# Patient Record
Sex: MALE | Race: BLACK OR AFRICAN AMERICAN | NOT HISPANIC OR LATINO | URBAN - NONMETROPOLITAN AREA
[De-identification: names, ages, dates, MRNs, and addresses within clinical notes are randomized per-mention and may not be internally consistent; named-entity substitution may affect disease eponyms.]

---

## 2019-04-12 ENCOUNTER — IMPORTED ENCOUNTER (OUTPATIENT)
Dept: URBAN - NONMETROPOLITAN AREA CLINIC 1 | Facility: CLINIC | Age: 69
End: 2019-04-12

## 2019-04-12 PROBLEM — H35.373: Noted: 2019-04-12

## 2019-04-12 PROBLEM — H40.1131: Noted: 2019-04-12

## 2019-04-12 PROBLEM — H43.813: Noted: 2019-04-12

## 2019-04-12 PROBLEM — E11.3593: Noted: 2019-04-12

## 2019-04-12 PROBLEM — Z96.1: Noted: 2019-04-12

## 2019-04-12 PROCEDURE — 99214 OFFICE O/P EST MOD 30 MIN: CPT

## 2019-04-12 PROCEDURE — 92083 EXTENDED VISUAL FIELD XM: CPT

## 2019-04-12 NOTE — PATIENT DISCUSSION
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 IOP at 12 OUC/D ratio is 0.4 OD and  0.7 OSContinue Timolol QAM OU and Latanoprost QHS OU Rx sent to [facility] in 3 months for follow up with Hx PDR s/p PRP OUDiscussed diagnosis with patientDiscussed the risk of diabetic damage of the retina with potential vision loss and the importance of routine follow-up. Emphasized strict blood sugar control. Continue to monitorPVD OUDiscussed findings of exam in detail with the patient. The risk of retinal detachment in patients with PVDs was discussed with the patient and the warning signs of retinal detachment were carefully reviewed with the patient. The patient was warned to return to the office or contact the ophthalmologist on call immediately if they experience signs of retinal detachment. Continue to monitorERM OUDiscussed diagnosis with patientContinue to monitorPseudophakia OUDiscussed diagnosis in detail with patientBoth intraocular lenses in place today and stableContinue to monitor; 's Notes: MR 6/5/15DFE 4/12/19VF 4/12/19OCT 11/10/17Gonio 3/9/18Optos 5/26/17

## 2020-09-18 ENCOUNTER — IMPORTED ENCOUNTER (OUTPATIENT)
Dept: URBAN - NONMETROPOLITAN AREA CLINIC 1 | Facility: CLINIC | Age: 70
End: 2020-09-18

## 2020-09-18 PROCEDURE — 99214 OFFICE O/P EST MOD 30 MIN: CPT

## 2020-09-18 PROCEDURE — 92133 CPTRZD OPH DX IMG PST SGM ON: CPT

## 2020-09-18 NOTE — PATIENT DISCUSSION
POAG OUDiscussed diagnosis with patientNo family history of diseaseDiscussed the chronic progressive nature of this disease and various treatment optionsImportance of good compliance with medications was emphasizedOCT done today; stable from previous with superior/inferior/temp NFL thinning OU. IOP at 12 OU. C/D ratio is 0.4 OD and  0.7 OSContinue Timolol QAM OU and Latanoprost QHS OU Rx sent to [facility] in 6 months for follow up with Optos and Gonio Hx PDR s/p PRP OUDiscussed diagnosis with patientDiscussed the risk of diabetic damage of the retina with potential vision loss and the importance of routine follow-up. Emphasized strict blood sugar control. Continue to monitorPVD OUDiscussed findings of exam in detail with the patient. The risk of retinal detachment in patients with PVDs was discussed with the patient and the warning signs of retinal detachment were carefully reviewed with the patient. The patient was warned to return to the office or contact the ophthalmologist on call immediately if they experience signs of retinal detachment. Continue to monitorERM OUDiscussed diagnosis with patientContinue to monitorPseudophakia OUDiscussed diagnosis in detail with patientBoth intraocular lenses in place today and stableContinue to monitor; 's Notes: MR 6/5/15DFE 9/18/20VF 4/12/19OCT 9/18/2Gonio 3/9/18Optos 5/26/17

## 2021-04-16 ENCOUNTER — IMPORTED ENCOUNTER (OUTPATIENT)
Dept: URBAN - NONMETROPOLITAN AREA CLINIC 1 | Facility: CLINIC | Age: 71
End: 2021-04-16

## 2021-04-16 PROCEDURE — 92020 GONIOSCOPY: CPT

## 2021-04-16 PROCEDURE — 99214 OFFICE O/P EST MOD 30 MIN: CPT

## 2021-04-16 NOTE — PATIENT DISCUSSION
POAG OUDiscussed diagnosis with patientNo family history of diseaseDiscussed the chronic progressive nature of this disease and various treatment optionsImportance of good compliance with medications was emphasizedGonio done today:  Grade IV with light pigment OUOCT done previously:  Stable from previous with superior/inferior/temp NFL thinning OU. IOP at 12 OD and 13 OSC/D ratio is 0.4 OD and  0.7 OSContinue Timolol QAM OU and Latanoprost QHS OU Rx sent to [facility] in 6 months for recheckHx PDR s/p PRP OUDiscussed diagnosis with patientDiscussed the risk of diabetic damage of the retina with potential vision loss and the importance of routine follow-up. Emphasized strict blood sugar control. Continue to monitorPVD OUDiscussed findings of exam in detail with the patient. The risk of retinal detachment in patients with PVDs was discussed with the patient and the warning signs of retinal detachment were carefully reviewed with the patient. The patient was warned to return to the office or contact the ophthalmologist on call immediately if they experience signs of retinal detachment. Continue to monitorERM OUDiscussed diagnosis with patientContinue to monitorPseudophakia OUDiscussed diagnosis in detail with patientBoth intraocular lenses in place today and stableContinue to monitor; 's Notes: MR 6/5/15DFE 9/18/20VF 4/12/19OCT 9/18/2Gonio 4/16/2021Optos 5/26/17

## 2022-04-09 ASSESSMENT — VISUAL ACUITY
OD_SC: 20/29-2
OD_SC: 20/40
OD_PH: 20/30
OS_SC: 20/63-
OS_SC: 20/100
OS_SC: 20/100-
OD_SC: 20/30-1

## 2022-04-09 ASSESSMENT — TONOMETRY
OS_IOP_MMHG: 13
OS_IOP_MMHG: 12
OS_IOP_MMHG: 12
OD_IOP_MMHG: 12

## 2022-04-09 ASSESSMENT — PACHYMETRY
OS_CT_UM: 544; ADJ: THIN
OS_CT_UM: 544; ADJ: THIN
OD_CT_UM: 490; ADJ: VTHIN
OS_CT_UM: 544; ADJ: THIN
OD_CT_UM: 490; ADJ: VTHIN
OD_CT_UM: 490; ADJ: VTHIN